# Patient Record
(demographics unavailable — no encounter records)

---

## 2025-07-30 NOTE — ADDENDUM
[FreeTextEntry1] :  I, Neftaly Blackwell, affirm that I have recorded for Dr. Goode 07/30/2025 to the best of my ability. All medical record entries scribed were at my, Dr. Goode's, direction on 07/30/2025 . I have reviewed the chart and agree that the record accurately reflects my personal performance of the history, physical exam, assessment and plan. I have also personally directed, reviewed, and agree with the discharge instructions.

## 2025-07-30 NOTE — HISTORY OF PRESENT ILLNESS
[de-identified] : 65yM with dysphagia for about a month Doing well on famotidine.  Dysphagia much improved, no longer choking with solids or liquids Voice much improved s/p voice and swallow therapy Denies SOB, hemoptysis MBS 3/28/25 showing no aspiration. Saw neuro who decreased his prednisone to 15mg Patient denies otalgia, otorrhea, ear infections, tinnitus, dizziness, vertigo, headaches related to hearing.  Wife state hearing loss.   CT neck done 2/28/25. Impression: Mild asymmetric enlargement of the right laryngeal ventricle and piriform sinus suggestive of right vocal fold dysfunction. Correlate with direct visualization.  Nonspecific soft tissue nodule within the right tracheoesophageal groove which may represent a paratracheal lymph node versus exophytic thyroid nodule versus parathyroid adenoma.  No nodular or masslike enhancement in the aerodigestive tract.

## 2025-07-30 NOTE — PHYSICAL EXAM
[Midline] : trachea located in midline position [Normal] : no rashes [de-identified] : thrush observed on the tongue [de-identified] :  uvula and palate rise in the middle.  [de-identified] :  uvula and palate rise in the middle.